# Patient Record
Sex: MALE | Race: BLACK OR AFRICAN AMERICAN | NOT HISPANIC OR LATINO | Employment: FULL TIME | RURAL
[De-identification: names, ages, dates, MRNs, and addresses within clinical notes are randomized per-mention and may not be internally consistent; named-entity substitution may affect disease eponyms.]

---

## 2020-04-30 ENCOUNTER — HISTORICAL (OUTPATIENT)
Dept: ADMINISTRATIVE | Facility: HOSPITAL | Age: 35
End: 2020-04-30

## 2020-07-16 ENCOUNTER — HISTORICAL (OUTPATIENT)
Dept: ADMINISTRATIVE | Facility: HOSPITAL | Age: 35
End: 2020-07-16

## 2020-07-16 LAB — SARS-COV+SARS-COV-2 AG RESP QL IA.RAPID: POSITIVE

## 2020-07-30 ENCOUNTER — HISTORICAL (OUTPATIENT)
Dept: ADMINISTRATIVE | Facility: HOSPITAL | Age: 35
End: 2020-07-30

## 2020-07-30 LAB — SARS-COV+SARS-COV-2 AG RESP QL IA.RAPID: NEGATIVE

## 2020-10-12 ENCOUNTER — HISTORICAL (OUTPATIENT)
Dept: ADMINISTRATIVE | Facility: HOSPITAL | Age: 35
End: 2020-10-12

## 2020-12-08 ENCOUNTER — HISTORICAL (OUTPATIENT)
Dept: ADMINISTRATIVE | Facility: HOSPITAL | Age: 35
End: 2020-12-08

## 2021-08-02 ENCOUNTER — CLINICAL SUPPORT (OUTPATIENT)
Dept: PRIMARY CARE CLINIC | Facility: CLINIC | Age: 36
End: 2021-08-02

## 2021-08-02 PROCEDURE — 99499 NO LOS: ICD-10-PCS | Mod: ,,, | Performed by: NURSE PRACTITIONER

## 2021-08-02 PROCEDURE — 99000 SPECIMEN HANDLING OFFICE-LAB: CPT | Mod: ,,, | Performed by: NURSE PRACTITIONER

## 2021-08-02 PROCEDURE — 99499 UNLISTED E&M SERVICE: CPT | Mod: ,,, | Performed by: NURSE PRACTITIONER

## 2021-08-02 PROCEDURE — 99000 PR SPECIMEN HANDLING,DR OFF->LAB: ICD-10-PCS | Mod: ,,, | Performed by: NURSE PRACTITIONER

## 2021-11-03 ENCOUNTER — HOSPITAL ENCOUNTER (OUTPATIENT)
Dept: RADIOLOGY | Facility: HOSPITAL | Age: 36
Discharge: HOME | End: 2021-11-03
Attending: NURSE PRACTITIONER
Payer: COMMERCIAL

## 2021-11-03 ENCOUNTER — OFFICE VISIT (OUTPATIENT)
Dept: PRIMARY CARE CLINIC | Facility: CLINIC | Age: 36
End: 2021-11-03
Payer: COMMERCIAL

## 2021-11-03 VITALS
HEART RATE: 93 BPM | OXYGEN SATURATION: 100 % | RESPIRATION RATE: 20 BRPM | SYSTOLIC BLOOD PRESSURE: 142 MMHG | TEMPERATURE: 98 F | BODY MASS INDEX: 44.73 KG/M2 | HEIGHT: 69 IN | WEIGHT: 302 LBS | DIASTOLIC BLOOD PRESSURE: 82 MMHG

## 2021-11-03 DIAGNOSIS — M25.562 ACUTE PAIN OF LEFT KNEE: Primary | ICD-10-CM

## 2021-11-03 DIAGNOSIS — S86.912A STRAIN OF LEFT KNEE, INITIAL ENCOUNTER: ICD-10-CM

## 2021-11-03 DIAGNOSIS — M25.562 ACUTE PAIN OF LEFT KNEE: ICD-10-CM

## 2021-11-03 DIAGNOSIS — M25.462 PAIN AND SWELLING OF LEFT KNEE: ICD-10-CM

## 2021-11-03 DIAGNOSIS — M25.562 PAIN AND SWELLING OF LEFT KNEE: ICD-10-CM

## 2021-11-03 PROCEDURE — 73560 X-RAY EXAM OF KNEE 1 OR 2: CPT | Mod: 26,LT,, | Performed by: RADIOLOGY

## 2021-11-03 PROCEDURE — 73560 XR KNEE 1 OR 2 VIEW LEFT: ICD-10-PCS | Mod: 26,LT,, | Performed by: RADIOLOGY

## 2021-11-03 PROCEDURE — 73560 X-RAY EXAM OF KNEE 1 OR 2: CPT | Mod: TC,LT

## 2021-11-03 PROCEDURE — 99214 PR OFFICE/OUTPT VISIT, EST, LEVL IV, 30-39 MIN: ICD-10-PCS | Mod: ,,, | Performed by: NURSE PRACTITIONER

## 2021-11-03 PROCEDURE — 99214 OFFICE O/P EST MOD 30 MIN: CPT | Mod: ,,, | Performed by: NURSE PRACTITIONER

## 2021-11-10 ENCOUNTER — OFFICE VISIT (OUTPATIENT)
Dept: PRIMARY CARE CLINIC | Facility: CLINIC | Age: 36
End: 2021-11-10
Payer: COMMERCIAL

## 2021-11-10 VITALS
TEMPERATURE: 99 F | RESPIRATION RATE: 20 BRPM | WEIGHT: 302 LBS | OXYGEN SATURATION: 99 % | SYSTOLIC BLOOD PRESSURE: 151 MMHG | HEIGHT: 69 IN | HEART RATE: 77 BPM | DIASTOLIC BLOOD PRESSURE: 91 MMHG | BODY MASS INDEX: 44.73 KG/M2

## 2021-11-10 DIAGNOSIS — S86.912D KNEE STRAIN, LEFT, SUBSEQUENT ENCOUNTER: Primary | ICD-10-CM

## 2021-11-10 PROCEDURE — 99213 PR OFFICE/OUTPT VISIT, EST, LEVL III, 20-29 MIN: ICD-10-PCS | Mod: ,,, | Performed by: NURSE PRACTITIONER

## 2021-11-10 PROCEDURE — 99213 OFFICE O/P EST LOW 20 MIN: CPT | Mod: ,,, | Performed by: NURSE PRACTITIONER

## 2021-11-10 RX ORDER — METHOCARBAMOL 500 MG/1
500 TABLET, FILM COATED ORAL 4 TIMES DAILY
Qty: 30 TABLET | Refills: 0 | Status: SHIPPED | OUTPATIENT
Start: 2021-11-10 | End: 2022-04-08

## 2021-11-10 RX ORDER — IBUPROFEN 800 MG/1
800 TABLET ORAL 3 TIMES DAILY
Qty: 30 TABLET | Refills: 0 | Status: SHIPPED | OUTPATIENT
Start: 2021-11-10 | End: 2021-12-10 | Stop reason: ALTCHOICE

## 2021-11-19 ENCOUNTER — OFFICE VISIT (OUTPATIENT)
Dept: PRIMARY CARE CLINIC | Facility: CLINIC | Age: 36
End: 2021-11-19
Payer: COMMERCIAL

## 2021-11-19 VITALS
BODY MASS INDEX: 37.42 KG/M2 | OXYGEN SATURATION: 97 % | DIASTOLIC BLOOD PRESSURE: 93 MMHG | HEART RATE: 93 BPM | HEIGHT: 75 IN | WEIGHT: 301 LBS | SYSTOLIC BLOOD PRESSURE: 151 MMHG | RESPIRATION RATE: 20 BRPM | TEMPERATURE: 98 F

## 2021-11-19 DIAGNOSIS — M25.462 PAIN AND SWELLING OF LEFT KNEE: ICD-10-CM

## 2021-11-19 DIAGNOSIS — M25.562 PAIN AND SWELLING OF LEFT KNEE: ICD-10-CM

## 2021-11-19 DIAGNOSIS — S86.912D KNEE STRAIN, LEFT, SUBSEQUENT ENCOUNTER: Primary | ICD-10-CM

## 2021-11-19 PROBLEM — S86.912A KNEE STRAIN, LEFT, INITIAL ENCOUNTER: Status: ACTIVE | Noted: 2021-11-19

## 2021-11-19 PROCEDURE — 99213 OFFICE O/P EST LOW 20 MIN: CPT | Mod: ,,, | Performed by: NURSE PRACTITIONER

## 2021-11-19 PROCEDURE — 99213 PR OFFICE/OUTPT VISIT, EST, LEVL III, 20-29 MIN: ICD-10-PCS | Mod: ,,, | Performed by: NURSE PRACTITIONER

## 2021-11-23 ENCOUNTER — CLINICAL SUPPORT (OUTPATIENT)
Dept: REHABILITATION | Facility: HOSPITAL | Age: 36
End: 2021-11-23
Payer: COMMERCIAL

## 2021-11-23 DIAGNOSIS — S86.912D KNEE STRAIN, LEFT, SUBSEQUENT ENCOUNTER: ICD-10-CM

## 2021-11-23 PROCEDURE — 97110 THERAPEUTIC EXERCISES: CPT

## 2021-11-23 PROCEDURE — 97161 PT EVAL LOW COMPLEX 20 MIN: CPT

## 2021-11-29 ENCOUNTER — OFFICE VISIT (OUTPATIENT)
Dept: PRIMARY CARE CLINIC | Facility: CLINIC | Age: 36
End: 2021-11-29
Payer: COMMERCIAL

## 2021-11-29 VITALS
WEIGHT: 302 LBS | SYSTOLIC BLOOD PRESSURE: 154 MMHG | BODY MASS INDEX: 37.55 KG/M2 | RESPIRATION RATE: 20 BRPM | HEART RATE: 81 BPM | HEIGHT: 75 IN | DIASTOLIC BLOOD PRESSURE: 85 MMHG | TEMPERATURE: 98 F | OXYGEN SATURATION: 99 %

## 2021-11-29 DIAGNOSIS — S86.912D KNEE STRAIN, LEFT, SUBSEQUENT ENCOUNTER: Primary | ICD-10-CM

## 2021-11-29 PROCEDURE — 99213 PR OFFICE/OUTPT VISIT, EST, LEVL III, 20-29 MIN: ICD-10-PCS | Mod: ,,, | Performed by: NURSE PRACTITIONER

## 2021-11-29 PROCEDURE — 99213 OFFICE O/P EST LOW 20 MIN: CPT | Mod: ,,, | Performed by: NURSE PRACTITIONER

## 2021-12-03 DIAGNOSIS — S86.912D KNEE STRAIN, LEFT, SUBSEQUENT ENCOUNTER: Primary | ICD-10-CM

## 2021-12-09 ENCOUNTER — OFFICE VISIT (OUTPATIENT)
Dept: PRIMARY CARE CLINIC | Facility: CLINIC | Age: 36
End: 2021-12-09
Payer: COMMERCIAL

## 2021-12-09 ENCOUNTER — CLINICAL SUPPORT (OUTPATIENT)
Dept: REHABILITATION | Facility: HOSPITAL | Age: 36
End: 2021-12-09
Payer: COMMERCIAL

## 2021-12-09 VITALS
BODY MASS INDEX: 37.55 KG/M2 | WEIGHT: 302 LBS | SYSTOLIC BLOOD PRESSURE: 145 MMHG | OXYGEN SATURATION: 99 % | TEMPERATURE: 98 F | RESPIRATION RATE: 18 BRPM | HEART RATE: 78 BPM | DIASTOLIC BLOOD PRESSURE: 80 MMHG | HEIGHT: 75 IN

## 2021-12-09 DIAGNOSIS — S86.912A STRAIN OF LEFT KNEE, INITIAL ENCOUNTER: Primary | ICD-10-CM

## 2021-12-09 DIAGNOSIS — S83.242D ACUTE MEDIAL MENISCUS TEAR OF LEFT KNEE, SUBSEQUENT ENCOUNTER: Primary | ICD-10-CM

## 2021-12-09 PROBLEM — S83.242A ACUTE MEDIAL MENISCUS TEAR OF LEFT KNEE: Status: ACTIVE | Noted: 2021-12-09

## 2021-12-09 PROCEDURE — 99213 OFFICE O/P EST LOW 20 MIN: CPT | Mod: ,,, | Performed by: NURSE PRACTITIONER

## 2021-12-09 PROCEDURE — 99213 PR OFFICE/OUTPT VISIT, EST, LEVL III, 20-29 MIN: ICD-10-PCS | Mod: ,,, | Performed by: NURSE PRACTITIONER

## 2021-12-09 PROCEDURE — 97140 MANUAL THERAPY 1/> REGIONS: CPT

## 2021-12-09 PROCEDURE — 97016 VASOPNEUMATIC DEVICE THERAPY: CPT

## 2021-12-09 PROCEDURE — 97110 THERAPEUTIC EXERCISES: CPT

## 2021-12-13 ENCOUNTER — CLINICAL SUPPORT (OUTPATIENT)
Dept: REHABILITATION | Facility: HOSPITAL | Age: 36
End: 2021-12-13
Payer: COMMERCIAL

## 2021-12-13 DIAGNOSIS — S83.242A ACUTE MEDIAL MENISCUS TEAR OF LEFT KNEE, INITIAL ENCOUNTER: Primary | ICD-10-CM

## 2021-12-13 PROCEDURE — 97016 VASOPNEUMATIC DEVICE THERAPY: CPT

## 2021-12-13 PROCEDURE — 97110 THERAPEUTIC EXERCISES: CPT

## 2021-12-27 ENCOUNTER — CLINICAL SUPPORT (OUTPATIENT)
Dept: REHABILITATION | Facility: HOSPITAL | Age: 36
End: 2021-12-27
Payer: COMMERCIAL

## 2021-12-27 ENCOUNTER — DOCUMENTATION ONLY (OUTPATIENT)
Dept: REHABILITATION | Facility: HOSPITAL | Age: 36
End: 2021-12-27
Payer: COMMERCIAL

## 2021-12-27 DIAGNOSIS — S83.242D ACUTE MEDIAL MENISCUS TEAR OF LEFT KNEE, SUBSEQUENT ENCOUNTER: Primary | ICD-10-CM

## 2022-01-10 ENCOUNTER — CLINICAL SUPPORT (OUTPATIENT)
Dept: REHABILITATION | Facility: HOSPITAL | Age: 37
End: 2022-01-10
Payer: COMMERCIAL

## 2022-01-10 DIAGNOSIS — S83.242D TEAR OF MEDIAL MENISCUS OF LEFT KNEE, UNSPECIFIED TEAR TYPE, UNSPECIFIED WHETHER OLD OR CURRENT TEAR, SUBSEQUENT ENCOUNTER: Primary | ICD-10-CM

## 2022-01-10 PROCEDURE — 97110 THERAPEUTIC EXERCISES: CPT

## 2022-01-10 PROCEDURE — 97016 VASOPNEUMATIC DEVICE THERAPY: CPT

## 2022-01-10 NOTE — PLAN OF CARE
[facility] Updated PLAN OF CARE      Name: Nikhil Ford  Clinic Number: [id number]    Therapy Diagnosis: Left knee pain  Physician: Iva Sparks FNP    Visit Date: 1/10/2022     Physician Orders: PT Eval and Treat left knee pain  Medical Diagnosis from Referral: Left knee pain  Evaluation Date: 11/23/2021  Updated Plan of Care Due : 1/9/2022  Authorization Period Expiration: 11/10/2022  Plan of Care Expiration: 1/18/2022  Visit # / Visits authorized: 4 / 16      PTA Visit #: 0    Time In: 1:00 pm  Time Out: 1:55 pm  Total Billable Time: 55 minutes    Precautions: Standard  Prior Level of Function: Was able to perform all job duties with no pain or limitations.  Current Level of Function: Back at work but limited with difficulty performing job duties.         Subjective     Pt reports: That he has had to miss treatment due to Covid and that he sees Dr. Pena tomorrow.  He was compliant with home exercise program.    Pain: 6/10  Location: left knee      Objective       NIKHIL received therapeutic exercises to develop strength, ROM and flexibility for 40 minutes including:    Knee Exercises      Bike/Elliptical/Stairmaster  5 minutes   Calf Stretch  4 x 15 seconds    Hamstring Stretch  4 x 15 seconds    Quad Stretch  4 x 15 seconds    Cybex Leg Press - Bilateral  3 x 10 10 plates   Cybex Leg Press - Single   3 x 10 5 plates    Cybex Knee Extension    Cybex Hamstring Curls  3 x 10 5 plates   Cybex Hip - Abduction  3 x 10 3 plates   Cybex Hip - Flexion   3 x 10 3 plates   Cybex Hip - Extension  3 x 10 3 plates   Cable Knee TKE  3 x 10 8 plates   Wall Ball Squats    Squats    Monster Walks    Lunges    Skipping    Box Landings    Box Quick Steps                                Manual therapy consisting of re-assessment of meniscus tear, patella mobs, flexion and extension N/A minutes.      NIKHIL received the following supervised modalities after being cleared for contradictions:  Nikhil received  Pre-Modulation electrical stimulation for pain to the left knee. Pt received modulated mode  for N/A minutes. Nikhil tolerated treatment well without any adverse effects.         NIKHIL received Gameready for 15 minutes to left knee.        Home Exercises Provided and Patient Education Provided     Education provided: HOME EXERCISE PROGRAM     Written Home Exercises Provided: Patient instructed to cont prior HEP.  Exercises were reviewed and NIKHIL was able to demonstrate them prior to the end of the session.  NIKHIL demonstrated good  understanding of the education provided.     See EMR under Patient Instructions for exercises provided prior visit.    Assessment     Nikhil is a 36 y.o. male referred to outpatient Physical Therapy with a medical diagnosis of left knee pain. Patient presents with left knee pain, edema, decreased RANGE OF MOTION and strength, abnormal gait, tenderness of MCL with pain on valgus test and positive test for medial meniscus derangement. Patient may need MRI. Will utilize Graston Technique to left MCL along with stretching and strengthening.     Increased weight on hamstring curls today. Patient had to miss several treatments due to Covid. Patient continues to have a lot of pain and will see Dr. Pena tomorrow.     NIKHIL is progressing well towards his goals.   Pt prognosis is Good.     Pt will continue to benefit from skilled outpatient physical therapy to address the deficits listed in the problem list box on initial evaluation, provide pt/family education and to maximize pt's level of independence in the home and community environment.     Pt's spiritual, cultural and educational needs considered and pt agreeable to plan of care and goals.     Anticipated barriers to physical therapy: May need MRI    Goals:  Short Term Goals: 3 weeks   1. Independent with Home Exercise Program : Met  2. Increase Left Knee Active Range of Motion to 0 Degrees to 120 Degrees : Not met, current 110 degrees  3.  Increase Left Knee Strength to grossly 5/5 : Not met, current 4+/5  4. Patient will ambulate 500 feet with Normal Gait pattern with complaints of pain Less than or Equal to 4/10. : Not met, continued pain of 6/10     Long Term Goals: 6 weeks   1. Patient will show no signs of edema : Ongoing  2. Patient will ambulate 1000+ feet with with complaints of pain Less than or Equal to 2/10. : Ongoing    Reasons for Recertification of Therapy: Continue to work toward achievement of goals.    Plan     Updated Certification Period: 1/10/2022 to 2/9/2022  Recommended Treatment Plan: 2 times per week for 4 weeks: Electrical Stimulation Gameready, Gait Training, Manual Therapy, Moist Heat/ Ice, Neuromuscular Re-ed, Patient Education, Therapeutic Exercise and Pre-Modulation e-stim  Other Recommendations: None    REMY ALMANZA, PT, MLT    1/10/2022      I CERTIFY THE NEED FOR THESE SERVICES FURNISHED UNDER THIS PLAN OF TREATMENT AND WHILE UNDER MY CARE.    Physician's comments:      Physician's Signature: ___________________________________________________

## 2022-01-10 NOTE — PROGRESS NOTES
[facility] Treatment Note     Name: Nikhil Ford  Clinic Number: [id number]    Therapy Diagnosis: Left knee pain  Physician: Iva Sparks FNP    Visit Date: 1/10/2022     Physician Orders: PT Eval and Treat left knee pain  Medical Diagnosis from Referral: Left knee pain  Evaluation Date: 11/23/2021  Updated Plan of Care Due : 1/9/2022  Authorization Period Expiration: 11/10/2022  Plan of Care Expiration: 1/18/2022  Visit # / Visits authorized: 4 / 16      PTA Visit #: 0    Time In: 1:00 pm  Time Out: 1:55 pm  Total Billable Time: 55 minutes    Precautions: Standard  Prior Level of Function: Was able to perform all job duties with no pain or limitations.  Current Level of Function: Back at work but limited with difficulty performing job duties.         Subjective     Pt reports: That he has had to miss treatment due to Covid and that he sees Dr. Pena tomorrow.  He was compliant with home exercise program.    Pain: 6/10  Location: left knee      Objective       NIKHIL received therapeutic exercises to develop strength, ROM and flexibility for 40 minutes including:    Knee Exercises      Bike/Elliptical/Stairmaster  5 minutes   Calf Stretch  4 x 15 seconds    Hamstring Stretch  4 x 15 seconds    Quad Stretch  4 x 15 seconds    Cybex Leg Press - Bilateral  3 x 10 10 plates   Cybex Leg Press - Single   3 x 10 5 plates    Cybex Knee Extension    Cybex Hamstring Curls  3 x 10 5 plates   Cybex Hip - Abduction  3 x 10 3 plates   Cybex Hip - Flexion   3 x 10 3 plates   Cybex Hip - Extension  3 x 10 3 plates   Cable Knee TKE  3 x 10 8 plates   Wall Ball Squats    Squats    Monster Walks    Lunges    Skipping    Box Landings    Box Quick Steps                                Manual therapy consisting of re-assessment of meniscus tear, patella mobs, flexion and extension N/A minutes.      NIKHIL received the following supervised modalities after being cleared for contradictions:  Nikhil received  Pre-Modulation electrical stimulation for pain to the left knee. Pt received modulated mode  for N/A minutes. Nikhil tolerated treatment well without any adverse effects.         NIKHIL received Gameready for 15 minutes to left knee.        Home Exercises Provided and Patient Education Provided     Education provided: HOME EXERCISE PROGRAM     Written Home Exercises Provided: Patient instructed to cont prior HEP.  Exercises were reviewed and NIKHIL was able to demonstrate them prior to the end of the session.  NIKHIL demonstrated good  understanding of the education provided.     See EMR under Patient Instructions for exercises provided prior visit.    Assessment     Nikhil is a 36 y.o. male referred to outpatient Physical Therapy with a medical diagnosis of left knee pain. Patient presents with left knee pain, edema, decreased RANGE OF MOTION and strength, abnormal gait, tenderness of MCL with pain on valgus test and positive test for medial meniscus derangement. Patient may need MRI. Will utilize Graston Technique to left MCL along with stretching and strengthening.     Increased weight on hamstring curls today. Patient had to miss several treatments due to Covid. Patient continues to have a lot of pain and will see Dr. Pena tomorrow.     NIKHIL is progressing well towards his goals.   Pt prognosis is Good.     Pt will continue to benefit from skilled outpatient physical therapy to address the deficits listed in the problem list box on initial evaluation, provide pt/family education and to maximize pt's level of independence in the home and community environment.     Pt's spiritual, cultural and educational needs considered and pt agreeable to plan of care and goals.     Anticipated barriers to physical therapy: May need MRI    Goals:  Short Term Goals: 3 weeks   1. Independent with Home Exercise Program : Met  2. Increase Left Knee Active Range of Motion to 0 Degrees to 120 Degrees : Not met, current 110 degrees  3.  Increase Left Knee Strength to grossly 5/5 : Not met, current 4+/5  4. Patient will ambulate 500 feet with Normal Gait pattern with complaints of pain Less than or Equal to 4/10. : Not met, continued pain of 6/10     Long Term Goals: 6 weeks   1. Patient will show no signs of edema : Ongoing  2. Patient will ambulate 1000+ feet with with complaints of pain Less than or Equal to 2/10. : Ongoing      Plan        Plan of care Certification: 11/23/2021 to 1/18/2021     Outpatient Physical Therapy 2 times weekly for 8 weeks to include the following interventions: Electrical Stimulation Pre-Mod, Gait Training, Manual Therapy, Moist Heat/ Ice, Neuromuscular Re-ed, Patient Education and Therapeutic Exercise.          REMY ALMANZA, PT, MLT    1/10/2022
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
## 2022-01-20 ENCOUNTER — CLINICAL SUPPORT (OUTPATIENT)
Dept: REHABILITATION | Facility: HOSPITAL | Age: 37
End: 2022-01-20
Payer: COMMERCIAL

## 2022-01-20 DIAGNOSIS — S86.912D KNEE STRAIN, LEFT, SUBSEQUENT ENCOUNTER: Primary | ICD-10-CM

## 2022-01-20 NOTE — PLAN OF CARE
[facility] Discharge Summary     Name: Nikhil Ford  Clinic Number: [id number]    Therapy Diagnosis: Left knee pain  Physician: Iva Sparks FNP    Visit Date: 1/20/2022     Physician Orders: PT Eval and Treat left knee pain  Medical Diagnosis from Referral: Left knee pain  Evaluation Date: 11/23/2021  Updated Plan of Care Due : 1/9/2022  Authorization Period Expiration: 11/10/2022  Plan of Care Expiration: 1/18/2022  Visit # / Visits authorized: 6 / 16      PTA Visit #: 0    Time In: 1:00  Time Out: 1:15 pm  Total Billable Time: N/A    Precautions: Standard  Prior Level of Function: Was able to perform all job duties with no pain or limitations.  Current Level of Function: Back at work but limited with difficulty performing job duties.         Subjective     Pt reports: That he is scheduled for surgery 1/26/2022.  He was compliant with home exercise program.    Pain: 6/10  Location: left knee      Objective       NIKHIL received therapeutic exercises to develop strength, ROM and flexibility for N/A minutes including:    Knee Exercises      Bike/Elliptical/Stairmaster  5 minutes   Calf Stretch  4 x 15 seconds    Hamstring Stretch  4 x 15 seconds    Quad Stretch  4 x 15 seconds    Cybex Leg Press - Bilateral  3 x 10 10 plates   Cybex Leg Press - Single   3 x 10 5 plates    Cybex Knee Extension    Cybex Hamstring Curls  3 x 10 5 plates   Cybex Hip - Abduction  3 x 10 3 plates   Cybex Hip - Flexion   3 x 10 3 plates   Cybex Hip - Extension  3 x 10 3 plates   Cable Knee TKE  3 x 10 8 plates   Wall Ball Squats    Squats    Monster Walks    Lunges    Skipping    Box Landings    Box Quick Steps                                Manual therapy consisting of re-assessment of meniscus tear, patella mobs, flexion and extension N/A minutes.      NIKHIL received the following supervised modalities after being cleared for contradictions:  Nikhil received Pre-Modulation electrical stimulation for pain to the left  knee. Pt received modulated mode  for N/A minutes. Nikhil tolerated treatment well without any adverse effects.         NIKHIL received Gameready for 15 minutes to left knee.        Home Exercises Provided and Patient Education Provided     Education provided: HOME EXERCISE PROGRAM     Written Home Exercises Provided: Patient instructed to cont prior HEP.  Exercises were reviewed and NIKHIL was able to demonstrate them prior to the end of the session.  NIKHIL demonstrated good  understanding of the education provided.     See EMR under Patient Instructions for exercises provided prior visit.    Assessment     Nikhil is a 36 y.o. male referred to outpatient Physical Therapy with a medical diagnosis of left knee pain. Patient presents with left knee pain, edema, decreased RANGE OF MOTION and strength, abnormal gait, tenderness of MCL with pain on valgus test and positive test for medial meniscus derangement. Patient may need MRI. Will utilize Graston Technique to left MCL along with stretching and strengthening.     Patient is scheduled for surgery 1/26/2022 and is being discharged today.    NIKHIL is progressing well towards his goals.   Pt prognosis is Good.     Pt will continue to benefit from skilled outpatient physical therapy to address the deficits listed in the problem list box on initial evaluation, provide pt/family education and to maximize pt's level of independence in the home and community environment.     Pt's spiritual, cultural and educational needs considered and pt agreeable to plan of care and goals.     Anticipated barriers to physical therapy: May need MRI    Goals:  Short Term Goals: 3 weeks   1. Independent with Home Exercise Program : Met  2. Increase Left Knee Active Range of Motion to 0 Degrees to 120 Degrees : Not met, current 110 degrees  3. Increase Left Knee Strength to grossly 5/5 : Not met, current 4+/5  4. Patient will ambulate 500 feet with Normal Gait pattern with complaints of pain Less  than or Equal to 4/10. : Not met, continued pain of 6/10     Long Term Goals: 6 weeks   1. Patient will show no signs of edema : Ongoing  2. Patient will ambulate 1000+ feet with with complaints of pain Less than or Equal to 2/10. : Ongoing    Discharge reason: Other:  Patient is scheduled for surgery 1/26/2022.    Plan   This patient is discharged from Physical Therapy.    Date of Last visit: 1/20/2022  Total Visits Received: 6  Cancelled Visits: 5  No Show Visits: 0    REMY ALMANZA PT, MLT    1/20/2022

## 2022-01-20 NOTE — PROGRESS NOTES
[facility] Treatment Note     Name: Nikhil Ford  Clinic Number: [id number]    Therapy Diagnosis: Left knee pain  Physician: Iva Sparks FNP    Visit Date: 1/20/2022     Physician Orders: PT Eval and Treat left knee pain  Medical Diagnosis from Referral: Left knee pain  Evaluation Date: 11/23/2021  Updated Plan of Care Due : 1/9/2022  Authorization Period Expiration: 11/10/2022  Plan of Care Expiration: 1/18/2022  Visit # / Visits authorized: 6 / 16      PTA Visit #: 0    Time In: 1:00  Time Out: 1:15 pm  Total Billable Time: N/A    Precautions: Standard  Prior Level of Function: Was able to perform all job duties with no pain or limitations.  Current Level of Function: Back at work but limited with difficulty performing job duties.         Subjective     Pt reports: That he is scheduled for surgery 1/26/2022.  He was compliant with home exercise program.    Pain: 6/10  Location: left knee      Objective       NIKHIL received therapeutic exercises to develop strength, ROM and flexibility for N/A minutes including:    Knee Exercises      Bike/Elliptical/Stairmaster  5 minutes   Calf Stretch  4 x 15 seconds    Hamstring Stretch  4 x 15 seconds    Quad Stretch  4 x 15 seconds    Cybex Leg Press - Bilateral  3 x 10 10 plates   Cybex Leg Press - Single   3 x 10 5 plates    Cybex Knee Extension    Cybex Hamstring Curls  3 x 10 5 plates   Cybex Hip - Abduction  3 x 10 3 plates   Cybex Hip - Flexion   3 x 10 3 plates   Cybex Hip - Extension  3 x 10 3 plates   Cable Knee TKE  3 x 10 8 plates   Wall Ball Squats    Squats    Monster Walks    Lunges    Skipping    Box Landings    Box Quick Steps                                Manual therapy consisting of re-assessment of meniscus tear, patella mobs, flexion and extension N/A minutes.      NIKHIL received the following supervised modalities after being cleared for contradictions:  Nikhil received Pre-Modulation electrical stimulation for pain to the left  knee. Pt received modulated mode  for N/A minutes. Nikhil tolerated treatment well without any adverse effects.         NIKHIL received Gameready for 15 minutes to left knee.        Home Exercises Provided and Patient Education Provided     Education provided: HOME EXERCISE PROGRAM     Written Home Exercises Provided: Patient instructed to cont prior HEP.  Exercises were reviewed and NIKHIL was able to demonstrate them prior to the end of the session.  NIKHIL demonstrated good  understanding of the education provided.     See EMR under Patient Instructions for exercises provided prior visit.    Assessment     Nikhil is a 36 y.o. male referred to outpatient Physical Therapy with a medical diagnosis of left knee pain. Patient presents with left knee pain, edema, decreased RANGE OF MOTION and strength, abnormal gait, tenderness of MCL with pain on valgus test and positive test for medial meniscus derangement. Patient may need MRI. Will utilize Graston Technique to left MCL along with stretching and strengthening.     Patient is scheduled for surgery 1/26/2022 and is being discharged today.    NIKHIL is progressing well towards his goals.   Pt prognosis is Good.     Pt will continue to benefit from skilled outpatient physical therapy to address the deficits listed in the problem list box on initial evaluation, provide pt/family education and to maximize pt's level of independence in the home and community environment.     Pt's spiritual, cultural and educational needs considered and pt agreeable to plan of care and goals.     Anticipated barriers to physical therapy: May need MRI    Goals:  Short Term Goals: 3 weeks   1. Independent with Home Exercise Program : Met  2. Increase Left Knee Active Range of Motion to 0 Degrees to 120 Degrees : Not met, current 110 degrees  3. Increase Left Knee Strength to grossly 5/5 : Not met, current 4+/5  4. Patient will ambulate 500 feet with Normal Gait pattern with complaints of pain Less  than or Equal to 4/10. : Not met, continued pain of 6/10     Long Term Goals: 6 weeks   1. Patient will show no signs of edema : Ongoing  2. Patient will ambulate 1000+ feet with with complaints of pain Less than or Equal to 2/10. : Ongoing      Plan        Plan of care Certification: 11/23/2021 to 1/18/2021     Outpatient Physical Therapy 2 times weekly for 8 weeks to include the following interventions: Electrical Stimulation Pre-Mod, Gait Training, Manual Therapy, Moist Heat/ Ice, Neuromuscular Re-ed, Patient Education and Therapeutic Exercise.          REMY ALMANZA, PT, MLT    1/20/2022

## 2022-01-26 ENCOUNTER — ANESTHESIA EVENT (OUTPATIENT)
Dept: SURGERY | Facility: HOSPITAL | Age: 37
End: 2022-01-26
Payer: COMMERCIAL

## 2022-01-26 ENCOUNTER — ANESTHESIA (OUTPATIENT)
Dept: SURGERY | Facility: HOSPITAL | Age: 37
End: 2022-01-26
Payer: COMMERCIAL

## 2022-01-26 ENCOUNTER — HOSPITAL ENCOUNTER (OUTPATIENT)
Facility: HOSPITAL | Age: 37
Discharge: HOME | End: 2022-01-26
Attending: ORTHOPAEDIC SURGERY | Admitting: ORTHOPAEDIC SURGERY
Payer: COMMERCIAL

## 2022-01-26 VITALS
TEMPERATURE: 99 F | RESPIRATION RATE: 16 BRPM | HEIGHT: 75 IN | BODY MASS INDEX: 39.17 KG/M2 | HEART RATE: 73 BPM | OXYGEN SATURATION: 94 % | SYSTOLIC BLOOD PRESSURE: 135 MMHG | DIASTOLIC BLOOD PRESSURE: 78 MMHG | WEIGHT: 315 LBS

## 2022-01-26 DIAGNOSIS — Z87.828 HISTORY OF MENISCAL TEAR: Primary | ICD-10-CM

## 2022-01-26 PROCEDURE — 71000015 HC POSTOP RECOV 1ST HR: Performed by: ORTHOPAEDIC SURGERY

## 2022-01-26 PROCEDURE — 71000033 HC RECOVERY, INTIAL HOUR: Performed by: ORTHOPAEDIC SURGERY

## 2022-01-26 PROCEDURE — 25000003 PHARM REV CODE 250: Performed by: ORTHOPAEDIC SURGERY

## 2022-01-26 PROCEDURE — 37000008 HC ANESTHESIA 1ST 15 MINUTES: Performed by: ORTHOPAEDIC SURGERY

## 2022-01-26 PROCEDURE — D9220A PRA ANESTHESIA: Mod: ,,, | Performed by: ANESTHESIOLOGY

## 2022-01-26 PROCEDURE — 36000711: Performed by: ORTHOPAEDIC SURGERY

## 2022-01-26 PROCEDURE — 27100168 OPTIME MED/SURG SUP & DEVICES NON-STERILE SUPPLY: Performed by: ORTHOPAEDIC SURGERY

## 2022-01-26 PROCEDURE — 63600175 PHARM REV CODE 636 W HCPCS: Performed by: NURSE ANESTHETIST, CERTIFIED REGISTERED

## 2022-01-26 PROCEDURE — C1713 ANCHOR/SCREW BN/BN,TIS/BN: HCPCS | Performed by: ORTHOPAEDIC SURGERY

## 2022-01-26 PROCEDURE — 71000016 HC POSTOP RECOV ADDL HR: Performed by: ORTHOPAEDIC SURGERY

## 2022-01-26 PROCEDURE — 63600175 PHARM REV CODE 636 W HCPCS: Performed by: ANESTHESIOLOGY

## 2022-01-26 PROCEDURE — 37000009 HC ANESTHESIA EA ADD 15 MINS: Performed by: ORTHOPAEDIC SURGERY

## 2022-01-26 PROCEDURE — 36000710: Performed by: ORTHOPAEDIC SURGERY

## 2022-01-26 PROCEDURE — 97161 PT EVAL LOW COMPLEX 20 MIN: CPT

## 2022-01-26 PROCEDURE — 27201423 OPTIME MED/SURG SUP & DEVICES STERILE SUPPLY: Performed by: ORTHOPAEDIC SURGERY

## 2022-01-26 PROCEDURE — 25000003 PHARM REV CODE 250: Performed by: ANESTHESIOLOGY

## 2022-01-26 PROCEDURE — D9220A PRA ANESTHESIA: ICD-10-PCS | Mod: ,,, | Performed by: ANESTHESIOLOGY

## 2022-01-26 PROCEDURE — 25000003 PHARM REV CODE 250: Performed by: NURSE ANESTHETIST, CERTIFIED REGISTERED

## 2022-01-26 RX ORDER — BUPIVACAINE HYDROCHLORIDE 5 MG/ML
INJECTION, SOLUTION EPIDURAL; INTRACAUDAL
Status: DISCONTINUED | OUTPATIENT
Start: 2022-01-26 | End: 2022-01-26 | Stop reason: HOSPADM

## 2022-01-26 RX ORDER — OXYCODONE HYDROCHLORIDE 5 MG/1
5 TABLET ORAL
Status: DISCONTINUED | OUTPATIENT
Start: 2022-01-26 | End: 2022-01-26 | Stop reason: HOSPADM

## 2022-01-26 RX ORDER — SODIUM CHLORIDE, SODIUM LACTATE, POTASSIUM CHLORIDE, CALCIUM CHLORIDE 600; 310; 30; 20 MG/100ML; MG/100ML; MG/100ML; MG/100ML
INJECTION, SOLUTION INTRAVENOUS CONTINUOUS PRN
Status: DISCONTINUED | OUTPATIENT
Start: 2022-01-26 | End: 2022-01-26

## 2022-01-26 RX ORDER — HYDROCODONE BITARTRATE AND ACETAMINOPHEN 5; 325 MG/1; MG/1
1 TABLET ORAL EVERY 4 HOURS PRN
Status: DISCONTINUED | OUTPATIENT
Start: 2022-01-26 | End: 2022-01-26 | Stop reason: HOSPADM

## 2022-01-26 RX ORDER — ONDANSETRON 2 MG/ML
4 INJECTION INTRAMUSCULAR; INTRAVENOUS DAILY PRN
Status: DISCONTINUED | OUTPATIENT
Start: 2022-01-26 | End: 2022-01-26 | Stop reason: HOSPADM

## 2022-01-26 RX ORDER — HYDROMORPHONE HYDROCHLORIDE 2 MG/ML
0.5 INJECTION, SOLUTION INTRAMUSCULAR; INTRAVENOUS; SUBCUTANEOUS EVERY 5 MIN PRN
Status: DISCONTINUED | OUTPATIENT
Start: 2022-01-26 | End: 2022-01-26 | Stop reason: HOSPADM

## 2022-01-26 RX ORDER — MIDAZOLAM HYDROCHLORIDE 1 MG/ML
INJECTION INTRAMUSCULAR; INTRAVENOUS
Status: DISCONTINUED | OUTPATIENT
Start: 2022-01-26 | End: 2022-01-26

## 2022-01-26 RX ORDER — LIDOCAINE HYDROCHLORIDE 20 MG/ML
INJECTION, SOLUTION EPIDURAL; INFILTRATION; INTRACAUDAL; PERINEURAL
Status: DISCONTINUED | OUTPATIENT
Start: 2022-01-26 | End: 2022-01-26

## 2022-01-26 RX ORDER — CEFAZOLIN SODIUM 1 G/3ML
INJECTION, POWDER, FOR SOLUTION INTRAMUSCULAR; INTRAVENOUS
Status: DISCONTINUED | OUTPATIENT
Start: 2022-01-26 | End: 2022-01-26

## 2022-01-26 RX ORDER — SODIUM CHLORIDE, SODIUM LACTATE, POTASSIUM CHLORIDE, CALCIUM CHLORIDE 600; 310; 30; 20 MG/100ML; MG/100ML; MG/100ML; MG/100ML
INJECTION, SOLUTION INTRAVENOUS CONTINUOUS
Status: DISCONTINUED | OUTPATIENT
Start: 2022-01-26 | End: 2022-01-26 | Stop reason: HOSPADM

## 2022-01-26 RX ORDER — MORPHINE SULFATE 10 MG/ML
4 INJECTION INTRAMUSCULAR; INTRAVENOUS; SUBCUTANEOUS EVERY 5 MIN PRN
Status: DISCONTINUED | OUTPATIENT
Start: 2022-01-26 | End: 2022-01-26 | Stop reason: HOSPADM

## 2022-01-26 RX ORDER — FENTANYL CITRATE 50 UG/ML
INJECTION, SOLUTION INTRAMUSCULAR; INTRAVENOUS
Status: DISCONTINUED | OUTPATIENT
Start: 2022-01-26 | End: 2022-01-26

## 2022-01-26 RX ORDER — DIPHENHYDRAMINE HYDROCHLORIDE 50 MG/ML
25 INJECTION INTRAMUSCULAR; INTRAVENOUS EVERY 6 HOURS PRN
Status: DISCONTINUED | OUTPATIENT
Start: 2022-01-26 | End: 2022-01-26 | Stop reason: HOSPADM

## 2022-01-26 RX ORDER — ONDANSETRON 4 MG/1
8 TABLET, ORALLY DISINTEGRATING ORAL EVERY 8 HOURS PRN
Status: DISCONTINUED | OUTPATIENT
Start: 2022-01-26 | End: 2022-01-26 | Stop reason: HOSPADM

## 2022-01-26 RX ORDER — HYDROCODONE BITARTRATE AND ACETAMINOPHEN 7.5; 325 MG/1; MG/1
1 TABLET ORAL EVERY 6 HOURS PRN
Qty: 20 TABLET | Refills: 0 | Status: SHIPPED | OUTPATIENT
Start: 2022-01-26 | End: 2022-04-08

## 2022-01-26 RX ORDER — IBUPROFEN 800 MG/1
800 TABLET ORAL 3 TIMES DAILY
Status: ON HOLD | COMMUNITY
End: 2022-01-26 | Stop reason: HOSPADM

## 2022-01-26 RX ORDER — MEPERIDINE HYDROCHLORIDE 25 MG/ML
25 INJECTION INTRAMUSCULAR; INTRAVENOUS; SUBCUTANEOUS EVERY 10 MIN PRN
Status: DISCONTINUED | OUTPATIENT
Start: 2022-01-26 | End: 2022-01-26 | Stop reason: HOSPADM

## 2022-01-26 RX ORDER — PROPOFOL 10 MG/ML
INJECTION, EMULSION INTRAVENOUS
Status: DISCONTINUED | OUTPATIENT
Start: 2022-01-26 | End: 2022-01-26

## 2022-01-26 RX ADMIN — MIDAZOLAM HYDROCHLORIDE 2 MG: 1 INJECTION, SOLUTION INTRAMUSCULAR; INTRAVENOUS at 08:01

## 2022-01-26 RX ADMIN — CEFAZOLIN 2 G: 1 INJECTION, POWDER, FOR SOLUTION INTRAMUSCULAR; INTRAVENOUS; PARENTERAL at 08:01

## 2022-01-26 RX ADMIN — PROPOFOL 200 MG: 10 INJECTION, EMULSION INTRAVENOUS at 08:01

## 2022-01-26 RX ADMIN — FENTANYL CITRATE 100 MCG: 50 INJECTION INTRAMUSCULAR; INTRAVENOUS at 08:01

## 2022-01-26 RX ADMIN — OXYCODONE HYDROCHLORIDE 5 MG: 5 TABLET ORAL at 10:01

## 2022-01-26 RX ADMIN — LIDOCAINE HYDROCHLORIDE 100 MG: 20 INJECTION, SOLUTION INTRAVENOUS at 08:01

## 2022-01-26 RX ADMIN — SODIUM CHLORIDE, POTASSIUM CHLORIDE, SODIUM LACTATE AND CALCIUM CHLORIDE: 600; 310; 30; 20 INJECTION, SOLUTION INTRAVENOUS at 09:01

## 2022-01-26 RX ADMIN — SODIUM CHLORIDE, POTASSIUM CHLORIDE, SODIUM LACTATE AND CALCIUM CHLORIDE: 600; 310; 30; 20 INJECTION, SOLUTION INTRAVENOUS at 08:01

## 2022-01-26 NOTE — ANESTHESIA PREPROCEDURE EVALUATION
01/26/2022  Nikhil Ford is a 36 y.o., male.    Anesthesia Evaluation    I have reviewed the Patient Summary Reports.    I have reviewed the Nursing Notes. I have reviewed the NPO Status.   I have reviewed the Medications.     Review of Systems  Anesthesia Hx:  No problems with previous Anesthesia    Social:  Non-Smoker, No Alcohol Use    Hematology/Oncology:  Hematology Normal   Oncology Normal     EENT/Dental:EENT/Dental Normal   Cardiovascular:  Cardiovascular Normal     Pulmonary:  Pulmonary Normal    Renal/:  Renal/ Normal     Hepatic/GI:  Hepatic/GI Normal    Musculoskeletal:  Musculoskeletal Normal    Neurological:  Neurology Normal    Endocrine:  Endocrine Normal    Dermatological:  Skin Normal    Psych:  Psychiatric Normal           Physical Exam  General:  Obesity    Airway/Jaw/Neck:  Airway Findings: Mouth Opening: Normal Mallampati: II     Eyes/Ears/Nose:  Eyes/Ears/Nose Findings:     Chest/Lungs:  Chest/Lungs Findings:        Mental Status:  Mental Status Findings:  Cooperative, Alert and Oriented         Anesthesia Plan  Type of Anesthesia, risks & benefits discussed:  Anesthesia Type:  general    Patient's Preference:   Plan Factors:          Intra-op Monitoring Plan: standard ASA monitors  Intra-op Monitoring Plan Comments:   Post Op Pain Control Plan: per primary service following discharge from PACU and multimodal analgesia  Post Op Pain Control Plan Comments:     Induction:   IV  Beta Blocker:  Patient is not currently on a Beta-Blocker (No further documentation required).       Informed Consent: Patient understands risks and agrees with Anesthesia plan.  Questions answered. Anesthesia consent signed with patient.  ASA Score: 1     Day of Surgery Review of History & Physical: I have interviewed and examined the patient. I have reviewed the patient's H&P dated:  There are no  significant changes.          Ready For Surgery From Anesthesia Perspective.

## 2022-01-26 NOTE — OP NOTE
Department of Orthopedic Surgery    Operative Note        Surgery Date: 1/26/2022     PREOPERATIVE DIAGNOSIS:  Left knee meniscal tear chondromalacia    POSTOPERATIVE DIAGNOSIS:  Left knee    Loose bodies   direct lateral meniscal tear  Chondromalacia grade 4 trochlear notch 2-3 of the patella grade 2 medial lateral compartments    PROCEDURE:  Left knee arthroscopy with removal loose bodies   partial lateral meniscectomy   shaving of chondromalacia and   drilling grade 4 chondral injury of the trochlear notch( chondroplasty)    IMPLANTS:   Implant Name Type Inv. Item Serial No.  Lot No. LRB No. Used Action   IMP WIRE GRACIE DOUBLE TROCAR 1.6MM - [id number]  IMP WIRE GRACIE DOUBLE TROCAR 1.6MM  FLACO BIOMET ([facility])  Left 1 Implanted and Explanted       SURGEON: Dr. Pena     ASSISTANT:  None    ANESTHESIA:  General    ESTIMATED BLOOD LOSS:  5 cc drains    COMPLICATIONS:  None    INDICATION:Nikhil Ford is a 36 y.o. 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    PROCEDURE: The patient was brought to the operating room.  A well-padded tourniquet was placed on the left upper leg.  The left was positioned in the arthroscopy phipps.  The right leg was positioned off the end of the table over a well-padded pillow.  The left leg was prepped and draped in normal sterile fashion and Esmarched.  The tourniquet was elevated to 300mm of Mercury.  Superior medial portal was made for inflow, anteromedial portal was made for the scope, anterior lateral portal was made for the working portal.  The knee was scoped through all compartments.      PF:  negative, pain or loose bodies  compartmentalization   Trochlear chondromalacia:  Grade 3-4  Patella chondromalacia: grade II    Medial:  Medial femoral chondyle chondromalacia : grade II  Medial tibial plateau chondromalacia grade II  Negative for meniscus tear.    Lateral:  Lateral femoral condyle chondromalacia: grade II  Lateral tibial plateau chondromalacia: grade II  Positive for meniscus tear.    Intercondylar notch:  ACL was intact obvious had some old injury with scar tissue present      There was of multiple loose bodies floating in the knee punches small fragmentation this was shaved out removed using graspers appear to be coming mainly from the patellofemoral joint is the source was fibrillated articular cartilage grade 2-3 wear of the patella that was shaved and grade 3 and 4 wear of the trochlear notch that was shaved and then drilled.  ACL was found to be intact functional but looks like he had an an old injury to it with some scar tissue in the middle of it probably a partial injury in the past  Medial compartment was scoped mild chondromalacia right grade 2 that was shaved no evette meniscal tear identified.  On probing superior and inferior surfaces    The lateral compartment had a little bit worse chondromalacia grade 2 of mainly of the out and possibly 3 the tibial plateau a direct lateral meniscal tear of parrot-beak tear resected back to stable surface using biters chondromalacia shaver was also loose bodies floating once again removed.  The trochlear groove was then drilled at this point actually then shaved smooth any loose cartilage.    The knee was scoped through compartments and irrigated out copiously. A drain was placed through the superomedial portal.  Portal sites were injected with plain Marcaine and closed with simple Nylon stitches.  A sterile bulk dressing was applied to the knee.  A compressive dressing from foot to thigh was applied.  The patient tolerated the procedure well without complications.                   Vincent Pena III

## 2022-01-26 NOTE — TRANSFER OF CARE
"Anesthesia Transfer of Care Note    Patient: Nikhil Ford    Procedure(s) Performed: Procedure(s) (LRB):  ARTHROSCOPY, KNEE (Left)  ARTHROSCOPY, KNEE, WITH MENISCECTOMY (Left)  ARTHROSCOPY, KNEE, WITH CHONDROPLASTY (Left)    Patient location: PACU    Anesthesia Type: general    Transport from OR: Transported from OR on room air with adequate spontaneous ventilation    Post pain: adequate analgesia    Post assessment: no apparent anesthetic complications and tolerated procedure well    Post vital signs: stable    Level of consciousness: awake    Nausea/Vomiting: no nausea    Complications: none    Transfer of care protocol was followed      Last vitals:   Visit Vitals  /75 (BP Location: Left arm, Patient Position: Lying)   Pulse 76   Temp 37 °C (98.6 °F)   Resp 16   Ht 6' 3" (1.905 m)   Wt (!) 143.3 kg (316 lb)   SpO2 95%   BMI 39.50 kg/m²     "

## 2022-01-26 NOTE — BRIEF OP NOTE
[facility] - Orthopedic Periop Services  Brief Operative Note    Surgery Date: 1/26/2022     Surgeon(s) and Role:     * Vincent Pena III, MD - Primary    Assisting Surgeon: None        Surgery Date: 1/26/2022     PREOPERATIVE DIAGNOSIS:  Left knee meniscal tear chondromalacia    POSTOPERATIVE DIAGNOSIS:  Left knee    Loose bodies   direct lateral meniscal tear  Chondromalacia grade 4 trochlear notch 2-3 of the patella grade 2 medial lateral compartments    PROCEDURE:  Left knee arthroscopy with removal loose bodies   partial lateral meniscectomy   shaving of chondromalacia and   drilling grade 4 chondral injury of the trochlear notch( chondroplasty)      Anesthesia:  General    Description of the findings of the procedure(s): See Op Note     Estimated Blood Loss:  5 cc drains placed         Specimens:   Specimen (24h ago, onward)            None            Discharge Note    OUTCOME: Patient tolerated treatment/procedure well without complication and is now ready for discharge.    DISPOSITION: Home or Self Care    FINAL DIAGNOSIS:  History of meniscal tear    FOLLOWUP: In clinic    DISCHARGE INSTRUCTIONS:    Discharge Procedure Orders   Diet general     Call MD for:  temperature >100.4     Call MD for:  redness, tenderness, or signs of infection (pain, swelling, redness, odor or green/yellow discharge around incision site)     Remove dressing in 72 hours         Vincent Pena III

## 2022-01-26 NOTE — PT/OT/SLP EVAL
Physical Therapy Evaluation    Patient Name:  Nikhil Ford   MRN:  [id number]    Recommendations:     Discharge Recommendations:  home   Discharge Equipment Recommendations: crutches, axillary   Barriers to discharge: None    Assessment:     Nikhil Ford is a 36 y.o. male admitted with a medical diagnosis of History of meniscal tear.  He presents with the following impairments/functional limitations:    Patient with good use of crutches wbat.    Rehab Prognosis: Good; patient would benefit from acute skilled PT services to address these deficits and reach maximum level of function.    Recent Surgery: Procedure(s) (LRB):  ARTHROSCOPY, KNEE (Left)  ARTHROSCOPY, KNEE, WITH MENISCECTOMY (Left)  ARTHROSCOPY, KNEE, WITH CHONDROPLASTY (Left) Day of Surgery    Plan:     During this hospitalization, patient to be seen 1 x/week to address the identified rehab impairments via gait training,therapeutic activities and progress toward the following goals:    · Plan of Care Expires:       Subjective     Chief Complaint: post op pain  Patient/Family Comments/goals: Plans on dc today  Pain/Comfort:  · Pain Rating 1: 4/10  · Location - Side 1: Left  · Location 1: knee  · Pain Addressed 1: Pre-medicate for activity    Patients cultural, spiritual, Hoahaoism conflicts given the current situation: no    Living Environment:  Lives at home with family  Prior to admission, patients level of function was independetn.  Equipment used at home: none.  DME owned (not currently used): none.  Upon discharge, patient will have assistance from family.    Objective:     Communicated with nurse prior to session.  Patient found supine with    upon PT entry to room.    General Precautions: Standard, fall   Orthopedic Precautions:    Braces:    Respiratory Status: Room air    Exams:  · na    Functional Mobility:  · Gait: ambulated 10 feet with crutches wbat    Therapeutic Activities and Exercises:   PROM    AM-PAC 6 CLICK MOBILITY  Total  Score:24     Patient left supine with call button in reach.    GOALS:   Multidisciplinary Problems     Physical Therapy Goals     Not on file          Multidisciplinary Problems (Resolved)        Problem: Physical Therapy Goal    Goal Priority Disciplines Outcome Goal Variances Interventions   Physical Therapy Goal   (Resolved)     PT, PT/OT Met     Description: Short Term Goals  Independent with HEP  Independent with crutchesx 10 feet FWB/WBAT: left lower extremity    Long term goals  Needed equipment for home.                        History:     No past medical history on file.    No past surgical history on file.    Time Tracking:     PT Received On: 01/26/22  PT Start Time: 1130     PT Stop Time: 1155  PT Total Time (min): 25 min     Billable Minutes: Evaluation 20 01/26/2022

## 2022-02-01 NOTE — ANESTHESIA POSTPROCEDURE EVALUATION
Anesthesia Post Evaluation    Patient: Nikhil Ford    Procedure(s) Performed: Procedure(s) (LRB):  ARTHROSCOPY, KNEE (Left)  ARTHROSCOPY, KNEE, WITH MENISCECTOMY (Left)  ARTHROSCOPY, KNEE, WITH CHONDROPLASTY (Left)    Final Anesthesia Type: general      Patient location during evaluation: PACU  Patient participation: Yes- Able to Participate  Level of consciousness: awake and alert  Post-procedure vital signs: reviewed and stable  Pain management: adequate  Airway patency: patent  AMRIK mitigation strategies: Multimodal analgesia  PONV status at discharge: No PONV  Anesthetic complications: no      Cardiovascular status: blood pressure returned to baseline  Respiratory status: unassisted  Hydration status: euvolemic  Follow-up not needed.          Vitals Value Taken Time   /78 01/26/22 0945   Temp 37 °C (98.6 °F) 01/26/22 0914   Pulse 73 01/26/22 0945   Resp 16 01/26/22 1004   SpO2 94 % 01/26/22 0945         Event Time   Out of Recovery 09:49:00         Pain/Alex Score: No data recorded

## 2022-07-12 ENCOUNTER — HOSPITAL ENCOUNTER (OUTPATIENT)
Dept: RADIOLOGY | Facility: HOSPITAL | Age: 37
Discharge: HOME | End: 2022-07-12
Attending: NURSE PRACTITIONER

## 2022-07-12 ENCOUNTER — LAB VISIT (OUTPATIENT)
Dept: PRIMARY CARE CLINIC | Facility: CLINIC | Age: 37
End: 2022-07-12

## 2022-07-12 ENCOUNTER — OFFICE VISIT (OUTPATIENT)
Dept: PRIMARY CARE CLINIC | Facility: CLINIC | Age: 37
End: 2022-07-12
Payer: COMMERCIAL

## 2022-07-12 VITALS
BODY MASS INDEX: 39.17 KG/M2 | WEIGHT: 315 LBS | HEIGHT: 75 IN | SYSTOLIC BLOOD PRESSURE: 150 MMHG | DIASTOLIC BLOOD PRESSURE: 95 MMHG | HEART RATE: 81 BPM | OXYGEN SATURATION: 97 % | TEMPERATURE: 98 F | RESPIRATION RATE: 18 BRPM

## 2022-07-12 DIAGNOSIS — M79.645 PAIN OF FINGER OF LEFT HAND: ICD-10-CM

## 2022-07-12 DIAGNOSIS — M79.645 PAIN OF FINGER OF LEFT HAND: Primary | ICD-10-CM

## 2022-07-12 DIAGNOSIS — S60.10XA SUBUNGUAL HEMATOMA OF DIGIT OF HAND, INITIAL ENCOUNTER: ICD-10-CM

## 2022-07-12 DIAGNOSIS — Z02.83 ENCOUNTER FOR DRUG SCREENING: Primary | ICD-10-CM

## 2022-07-12 PROCEDURE — 99000 PR SPECIMEN HANDLING,DR OFF->LAB: ICD-10-PCS | Mod: ,,, | Performed by: NURSE PRACTITIONER

## 2022-07-12 PROCEDURE — 99214 OFFICE O/P EST MOD 30 MIN: CPT | Mod: ,,, | Performed by: NURSE PRACTITIONER

## 2022-07-12 PROCEDURE — 73140 X-RAY EXAM OF FINGER(S): CPT | Mod: TC,LT

## 2022-07-12 PROCEDURE — 73140 XR FINGER 2 OR MORE VIEWS LEFT: ICD-10-PCS | Mod: 26,LT,, | Performed by: RADIOLOGY

## 2022-07-12 PROCEDURE — 73140 X-RAY EXAM OF FINGER(S): CPT | Mod: 26,LT,, | Performed by: RADIOLOGY

## 2022-07-12 PROCEDURE — 99214 PR OFFICE/OUTPT VISIT, EST, LEVL IV, 30-39 MIN: ICD-10-PCS | Mod: ,,, | Performed by: NURSE PRACTITIONER

## 2022-07-12 PROCEDURE — 99000 SPECIMEN HANDLING OFFICE-LAB: CPT | Mod: ,,, | Performed by: NURSE PRACTITIONER

## 2022-07-12 NOTE — PROGRESS NOTES
Subjective:       Patient ID: Nikhil Ford is a 36 y.o. male presenting with Work Related Injury (Patient presents here today with work related injury to left ring finger. 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 )  .    35 y/o male here for evaluation of a work related injury to his left ring finger. States he slammed it in metal door.     Review of Systems   Musculoskeletal:        There is a hematoma under the nail of the left ring finger. Nail is intact   All other systems reviewed and are negative.      PCP:   Katarina Bangura       Referring MD:  No referring provider defined for this encounter.    Medical History:  has no past medical history on file.    Surgical History:  has a past surgical history that includes Arthroscopy of knee (Left, 1/26/2022); Knee arthroscopy w/ meniscectomy (Left, 1/26/2022); and Arthroscopic chondroplasty of knee joint (Left, 1/26/2022).    Family History: family history is not on file..     Social History:  reports that he has been smoking. He has never used smokeless tobacco.    Review of patient's allergies indicates:  No Known Allergies    Medication List with Changes/Refills   Current Medications    MELOXICAM (MOBIC) 7.5 MG TABLET    Take 1 tablet (7.5 mg total) by mouth once daily.         Objective:      Physical Exam  Vitals and nursing note reviewed.   Constitutional:       General: He is not in acute distress.     Appearance: Normal appearance.   HENT:      Head: Normocephalic.      Nose: Nose normal. No congestion.      Mouth/Throat:      Mouth: Mucous membranes are moist.      Pharynx: Oropharynx is clear.   Eyes:      General: No scleral icterus.     Extraocular Movements: Extraocular movements intact.      Conjunctiva/sclera: Conjunctivae normal.      Pupils: Pupils are equal, round, and reactive to light.   Cardiovascular:      Rate and Rhythm: Normal rate and regular rhythm.      Pulses: Normal pulses.      Heart  "sounds: Normal heart sounds. No murmur heard.    No friction rub. No gallop.   Pulmonary:      Effort: Pulmonary effort is normal. No respiratory distress.      Breath sounds: Normal breath sounds. No stridor. No wheezing, rhonchi or rales.   Abdominal:      General: There is no distension.      Palpations: There is no mass.      Tenderness: There is no abdominal tenderness. There is no left CVA tenderness, guarding or rebound.      Hernia: No hernia is present.   Musculoskeletal:         General: No swelling. Normal range of motion.      Right hand: Normal.      Left hand: Swelling and tenderness present.        Arms:       Cervical back: Normal range of motion and neck supple.   Lymphadenopathy:      Cervical: No cervical adenopathy.   Skin:     General: Skin is warm and dry.      Capillary Refill: Capillary refill takes less than 2 seconds.      Coloration: Skin is not jaundiced or pale.   Neurological:      General: No focal deficit present.      Mental Status: He is alert and oriented to person, place, and time.      Cranial Nerves: No cranial nerve deficit.      Sensory: No sensory deficit.      Gait: Gait normal.   Psychiatric:         Mood and Affect: Mood normal.         Behavior: Behavior normal.         Thought Content: Thought content normal.         Judgment: Judgment normal.           Vital Signs:  BP (!) 150/95 (BP Location: Right arm)   Pulse 81   Temp 97.7 °F (36.5 °C) (Oral)   Resp 18   Ht 6' 3" (1.905 m)   Wt (!) 143.3 kg (316 lb)   SpO2 97%   BMI 39.50 kg/m²   Body mass index is 39.5 kg/m².        Labs reviewed:  Lab Results   Component Value Date    WBC 10.54 01/24/2022    HGB 16.0 01/24/2022    HCT 47.9 01/24/2022     01/24/2022    ALT 25 01/24/2022    AST 19 01/24/2022     01/24/2022    K 4.5 01/24/2022     01/24/2022    CREATININE 1.34 (H) 01/24/2022    BUN 13 01/24/2022    CO2 29 01/24/2022       Assessment:       1. Pain of finger of left hand    2. Subungual " hematoma of digit of hand, initial encounter        Plan:       1. Evacuation of subungual hematoma left ring finger      Recommendations:  1. Ice and elevate  2. Ibuprofen 2 every 6 hours with food as needed for pain  3. Return to work tomorrow    Follow up if symptoms worsen or fail to improve, for subungual hematoma left ring finger.      Order summary:  Orders Placed This Encounter    X-Ray Finger 2 or More Views Left             KEERTHI Poole

## 2022-08-10 ENCOUNTER — HOSPITAL ENCOUNTER (OUTPATIENT)
Dept: RADIOLOGY | Facility: HOSPITAL | Age: 37
Discharge: HOME | End: 2022-08-10
Attending: ORTHOPAEDIC SURGERY
Payer: COMMERCIAL

## 2022-08-10 DIAGNOSIS — M25.462 PAIN AND SWELLING OF LEFT KNEE: ICD-10-CM

## 2022-08-10 DIAGNOSIS — M25.562 PAIN AND SWELLING OF LEFT KNEE: ICD-10-CM

## 2022-08-10 PROBLEM — M22.42 CHONDROMALACIA, PATELLA, LEFT: Status: ACTIVE | Noted: 2022-08-10

## 2022-08-10 PROCEDURE — 73560 X-RAY EXAM OF KNEE 1 OR 2: CPT | Mod: TC,LT

## 2022-10-24 ENCOUNTER — LAB VISIT (OUTPATIENT)
Dept: PRIMARY CARE CLINIC | Facility: CLINIC | Age: 37
End: 2022-10-24

## 2022-10-24 DIAGNOSIS — Z02.83 ENCOUNTER FOR EMPLOYMENT-RELATED DRUG TESTING: Primary | ICD-10-CM

## 2022-10-24 PROCEDURE — 99000 PR SPECIMEN HANDLING,DR OFF->LAB: ICD-10-PCS | Mod: ,,, | Performed by: NURSE PRACTITIONER

## 2022-10-24 PROCEDURE — 99000 SPECIMEN HANDLING OFFICE-LAB: CPT | Mod: ,,, | Performed by: NURSE PRACTITIONER

## 2022-10-24 NOTE — PROGRESS NOTES
Subjective:       Patient ID: Nikhil Ford is a 37 y.o. male.    Chief Complaint: No chief complaint on file.    HPI  Review of Systems      Objective:      Physical Exam    Assessment:       Problem List Items Addressed This Visit    None  Visit Diagnoses       Encounter for employment-related drug testing    -  Primary            Plan:       Drug testing only

## 2024-05-29 ENCOUNTER — OFFICE VISIT (OUTPATIENT)
Dept: PRIMARY CARE CLINIC | Facility: CLINIC | Age: 39
End: 2024-05-29
Payer: COMMERCIAL

## 2024-05-29 ENCOUNTER — LAB VISIT (OUTPATIENT)
Dept: PRIMARY CARE CLINIC | Facility: CLINIC | Age: 39
End: 2024-05-29

## 2024-05-29 ENCOUNTER — HOSPITAL ENCOUNTER (OUTPATIENT)
Dept: RADIOLOGY | Facility: HOSPITAL | Age: 39
Discharge: HOME | End: 2024-05-29
Attending: NURSE PRACTITIONER

## 2024-05-29 VITALS
OXYGEN SATURATION: 98 % | BODY MASS INDEX: 39.17 KG/M2 | DIASTOLIC BLOOD PRESSURE: 84 MMHG | TEMPERATURE: 99 F | RESPIRATION RATE: 20 BRPM | HEIGHT: 75 IN | SYSTOLIC BLOOD PRESSURE: 145 MMHG | HEART RATE: 68 BPM | WEIGHT: 315 LBS

## 2024-05-29 DIAGNOSIS — M77.8 RIGHT WRIST TENDONITIS: ICD-10-CM

## 2024-05-29 DIAGNOSIS — Z02.83 ENCOUNTER FOR DRUG SCREENING: Primary | ICD-10-CM

## 2024-05-29 DIAGNOSIS — S66.911A MUSCLE STRAIN OF RIGHT WRIST, INITIAL ENCOUNTER: Primary | ICD-10-CM

## 2024-05-29 DIAGNOSIS — S66.911A MUSCLE STRAIN OF RIGHT WRIST, INITIAL ENCOUNTER: ICD-10-CM

## 2024-05-29 PROCEDURE — 73110 X-RAY EXAM OF WRIST: CPT | Mod: TC,RT

## 2024-05-29 PROCEDURE — 99000 SPECIMEN HANDLING OFFICE-LAB: CPT | Mod: ,,, | Performed by: NURSE PRACTITIONER

## 2024-05-29 PROCEDURE — 99213 OFFICE O/P EST LOW 20 MIN: CPT | Mod: ,,, | Performed by: NURSE PRACTITIONER

## 2024-05-29 PROCEDURE — 73110 X-RAY EXAM OF WRIST: CPT | Mod: 26,RT,, | Performed by: RADIOLOGY

## 2024-05-29 RX ORDER — IBUPROFEN 800 MG/1
800 TABLET ORAL 3 TIMES DAILY
Qty: 30 TABLET | Refills: 0 | Status: SHIPPED | OUTPATIENT
Start: 2024-05-29

## 2024-05-29 NOTE — PROGRESS NOTES
Subjective     Patient ID: Nikhil Ford is a 38 y.o. male.    Chief Complaint: No chief complaint on file.    HPI  Review of Systems       Objective     Physical Exam       Assessment and Plan     1. Encounter for drug screening        Drug testing only           No follow-ups on file.
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
## 2024-06-10 ENCOUNTER — OFFICE VISIT (OUTPATIENT)
Dept: PRIMARY CARE CLINIC | Facility: CLINIC | Age: 39
End: 2024-06-10
Payer: COMMERCIAL

## 2024-06-10 VITALS
OXYGEN SATURATION: 97 % | SYSTOLIC BLOOD PRESSURE: 143 MMHG | BODY MASS INDEX: 39.17 KG/M2 | HEART RATE: 80 BPM | RESPIRATION RATE: 20 BRPM | HEIGHT: 75 IN | TEMPERATURE: 98 F | WEIGHT: 315 LBS | DIASTOLIC BLOOD PRESSURE: 86 MMHG

## 2024-06-10 DIAGNOSIS — M77.8 RIGHT WRIST TENDONITIS: Primary | ICD-10-CM

## 2024-06-10 PROCEDURE — 99213 OFFICE O/P EST LOW 20 MIN: CPT | Mod: ,,, | Performed by: NURSE PRACTITIONER

## 2024-06-10 NOTE — PROGRESS NOTES
Subjective     Patient ID: Nikhil Ford is a 38 y.o. male.    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 The injury occurred on 5/28/ 24 while at work. He states the pain is a little better but still has some pain with movement and lifting.       Review of Systems   All other systems reviewed and are negative.         Objective     Physical Exam  Vitals and nursing note reviewed.   Constitutional:       Appearance: Normal appearance.   HENT:      Head: Normocephalic.   Eyes:      Pupils: Pupils are equal, round, and reactive to light.   Cardiovascular:      Rate and Rhythm: Normal rate and regular rhythm.      Heart sounds: Normal heart sounds.   Pulmonary:      Effort: Pulmonary effort is normal.      Breath sounds: Normal breath sounds.   Musculoskeletal:         General: Swelling, tenderness and signs of injury present. No deformity. Normal range of motion.      Cervical back: Normal range of motion.   Skin:     General: Skin is warm and dry.   Neurological:      General: No focal deficit present.      Mental Status: He is alert and oriented to person, place, and time.   Psychiatric:         Attention and Perception: Attention and perception normal.         Mood and Affect: Mood normal.         Speech: Speech normal.         Behavior: Behavior normal. Behavior is cooperative.         Cognition and Memory: Cognition normal.         Judgment: Judgment normal.            Assessment and Plan     1. Right wrist tendonitis        RTW with limited use of right hand. RTN to [facility] 6/17/24. Ice and ibuprofen as prescribed. Elevate when possible.          No follow-ups on file.

## 2024-06-18 ENCOUNTER — OFFICE VISIT (OUTPATIENT)
Dept: PRIMARY CARE CLINIC | Facility: CLINIC | Age: 39
End: 2024-06-18

## 2024-06-18 VITALS
BODY MASS INDEX: 39.17 KG/M2 | OXYGEN SATURATION: 98 % | RESPIRATION RATE: 20 BRPM | DIASTOLIC BLOOD PRESSURE: 60 MMHG | HEIGHT: 75 IN | TEMPERATURE: 98 F | WEIGHT: 315 LBS | SYSTOLIC BLOOD PRESSURE: 120 MMHG | HEART RATE: 70 BPM

## 2024-06-18 DIAGNOSIS — M77.8 RIGHT WRIST TENDONITIS: Primary | ICD-10-CM

## 2024-06-18 DIAGNOSIS — M79.641 RIGHT HAND PAIN: ICD-10-CM

## 2024-06-18 PROCEDURE — 99213 OFFICE O/P EST LOW 20 MIN: CPT | Mod: ,,, | Performed by: NURSE PRACTITIONER

## 2024-06-18 RX ORDER — MELOXICAM 7.5 MG/1
7.5 TABLET ORAL DAILY
Qty: 10 TABLET | Refills: 0 | Status: SHIPPED | OUTPATIENT
Start: 2024-06-18

## 2024-06-18 NOTE — PROGRESS NOTES
Subjective     Patient ID: Nikhil Ford is a 38 y.o. male.    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 He only has pain with making a fist and lifting heavy objects. He has no swelling and good ROM. He is wearing his wrist splint while at work.       Review of Systems   Musculoskeletal:  Negative for joint swelling and joint deformity.   All other systems reviewed and are negative.         Objective     Physical Exam  Vitals and nursing note reviewed.   Constitutional:       Appearance: Normal appearance.   HENT:      Head: Normocephalic.   Eyes:      Pupils: Pupils are equal, round, and reactive to light.   Cardiovascular:      Rate and Rhythm: Normal rate and regular rhythm.      Heart sounds: Normal heart sounds.   Pulmonary:      Effort: Pulmonary effort is normal.      Breath sounds: Normal breath sounds.   Musculoskeletal:         General: Tenderness and signs of injury present. No swelling. Normal range of motion.      Cervical back: Normal range of motion.   Skin:     General: Skin is warm and dry.   Neurological:      General: No focal deficit present.      Mental Status: He is alert and oriented to person, place, and time.   Psychiatric:         Attention and Perception: Attention and perception normal.         Mood and Affect: Mood normal.         Speech: Speech normal.         Behavior: Behavior normal. Behavior is cooperative.         Cognition and Memory: Cognition normal.         Judgment: Judgment normal.            Assessment and Plan     1. Right wrist tendonitis    2. Right hand pain    Other orders  -     meloxicam (MOBIC) 7.5 MG tablet; Take 1 tablet (7.5 mg total) by mouth once daily.  Dispense: 10 tablet; Refill: 0        RTW with modified duty. Limited use of right hand/ arm. Stop taking Ibuprofen and take Mobic daily instead. RTN to [facility] in 1 week.           No follow-ups on file.

## 2024-06-26 ENCOUNTER — OFFICE VISIT (OUTPATIENT)
Dept: PRIMARY CARE CLINIC | Facility: CLINIC | Age: 39
End: 2024-06-26

## 2024-06-26 VITALS
OXYGEN SATURATION: 99 % | HEIGHT: 75 IN | TEMPERATURE: 97 F | SYSTOLIC BLOOD PRESSURE: 146 MMHG | DIASTOLIC BLOOD PRESSURE: 78 MMHG | RESPIRATION RATE: 20 BRPM | WEIGHT: 315 LBS | HEART RATE: 68 BPM | BODY MASS INDEX: 39.17 KG/M2

## 2024-06-26 DIAGNOSIS — M79.641 RIGHT HAND PAIN: ICD-10-CM

## 2024-06-26 DIAGNOSIS — M77.8 RIGHT WRIST TENDONITIS: Primary | ICD-10-CM

## 2024-06-26 NOTE — PROGRESS NOTES
Subjective     Patient ID: Nikhil Ford is a 38 y.o. male.    Chief Complaint: Work Related Injury (Presents today with work related injury to right wrist 05/28/24.)    37 y/o bm presents for follow up right wrist tendonitis. He states it is still intermittently painful especially with extreme flexion but overall its better. He is taking Mobic for inflammation.       Review of Systems   All other systems reviewed and are negative.         Objective     Physical Exam  Vitals and nursing note reviewed.   Constitutional:       Appearance: Normal appearance.   HENT:      Head: Normocephalic.   Eyes:      Pupils: Pupils are equal, round, and reactive to light.   Cardiovascular:      Rate and Rhythm: Normal rate and regular rhythm.      Heart sounds: Normal heart sounds.   Pulmonary:      Effort: Pulmonary effort is normal.      Breath sounds: Normal breath sounds.   Musculoskeletal:         General: Tenderness and signs of injury present. No swelling. Normal range of motion.      Cervical back: Normal range of motion.   Skin:     General: Skin is warm and dry.   Neurological:      General: No focal deficit present.      Mental Status: He is alert and oriented to person, place, and time.   Psychiatric:         Attention and Perception: Attention and perception normal.         Mood and Affect: Mood normal.         Speech: Speech normal.         Behavior: Behavior normal. Behavior is cooperative.         Cognition and Memory: Cognition normal.         Judgment: Judgment normal.     Imaging: X-Ray Wrist Complete 3 views Right    Result Date: 5/29/2024  EXAMINATION: XR WRIST COMPLETE 3 VIEWS RIGHT CLINICAL HISTORY: Strain of unspecified muscle, fascia and tendon at wrist and hand level, right hand, initial encounter TECHNIQUE: PA, lateral, and oblique views of the right wrist were performed. COMPARISON: None FINDINGS: No fracture detected.  No dislocation.  Joint space fairly well maintained.     No acute findings.  Electronically signed by: Luis Enrique Matamoros Date:    05/29/2024 Time:    11:33       Assessment and Plan     1. Right wrist tendonitis    2. Right hand pain        RTW full duty. Use wrist brace for comfort. Continue Mobic daily. RTN to [facility] PRN         No follow-ups on file.

## (undated) DEVICE — WOUND DRAINAGE KIT MEDIUM 10

## (undated) DEVICE — TOURNIQUET CUFF DISP QC 44 INCH

## (undated) DEVICE — WRAP KNEE ACTIVE PO WITH 4 COLD PKS L/XL

## (undated) DEVICE — APPLICATOR CHLORAPREP HI-LITE TINTED ORANGE 26ML

## (undated) DEVICE — BUR CUTTER 3.5MM RESECTOR FORMULA

## (undated) DEVICE — GLOVE SURGICAL PROTEXIS PI CLASSIC SIZE 8.0

## (undated) DEVICE — IMP WIRE KIRSCH DOUBLE TROCAR 1.6MM
Type: IMPLANTABLE DEVICE | Site: KNEE | Status: NON-FUNCTIONAL
Removed: 2022-01-26

## (undated) DEVICE — CUTTER TOMCAT 4X125MM

## (undated) DEVICE — BANDAGE ELASTIC FLEX-MASTER 6INX11YD STL DBL LNGTH

## (undated) DEVICE — GLOVE SURGICAL PROTEXIS PI CLASSIC SIZE 7.0

## (undated) DEVICE — SYRINGE 20CC LL PLASTIC  BX/48

## (undated) DEVICE — GLOVE SURGICAL PROTEXIS PI CLASSIC SIZE 6.5

## (undated) DEVICE — CDS KNEE ARTHROSCOPY

## (undated) DEVICE — GLOVE SURGICAL PROTEXIS PI BLUE SIZE 6.0

## (undated) DEVICE — TUBING ARTHRO STRYKER

## (undated) DEVICE — TOURNIQUET CUFF DISP QC 34 INCH

## (undated) DEVICE — SPONGE GAUZE 4X4 12 PLY STL AMD 10/TRAY

## (undated) DEVICE — CUTTER TOMCAT 3.5X125MM

## (undated) DEVICE — SOL IRRIGATION SALINE 3000ML BAG